# Patient Record
Sex: FEMALE | Race: OTHER | URBAN - METROPOLITAN AREA
[De-identification: names, ages, dates, MRNs, and addresses within clinical notes are randomized per-mention and may not be internally consistent; named-entity substitution may affect disease eponyms.]

---

## 2017-01-03 ENCOUNTER — EMERGENCY (EMERGENCY)
Facility: HOSPITAL | Age: 22
LOS: 1 days | Discharge: HOME | End: 2017-01-03
Attending: EMERGENCY MEDICINE
Payer: SELF-PAY

## 2017-01-03 VITALS
SYSTOLIC BLOOD PRESSURE: 126 MMHG | OXYGEN SATURATION: 100 % | TEMPERATURE: 100 F | DIASTOLIC BLOOD PRESSURE: 88 MMHG | HEART RATE: 86 BPM | RESPIRATION RATE: 20 BRPM

## 2017-01-03 VITALS — TEMPERATURE: 99 F | OXYGEN SATURATION: 100 % | HEART RATE: 110 BPM | RESPIRATION RATE: 24 BRPM

## 2017-01-03 DIAGNOSIS — M54.5 LOW BACK PAIN: ICD-10-CM
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
## 2017-01-03 DIAGNOSIS — R07.81 PLEURODYNIA: ICD-10-CM

## 2017-01-03 LAB — HCG UR QL: NEGATIVE — SIGNIFICANT CHANGE UP

## 2017-01-03 PROCEDURE — 96372 THER/PROPH/DIAG INJ SC/IM: CPT

## 2017-01-03 PROCEDURE — 71101 X-RAY EXAM UNILAT RIBS/CHEST: CPT | Mod: 26

## 2017-01-03 PROCEDURE — 81025 URINE PREGNANCY TEST: CPT

## 2017-01-03 PROCEDURE — 99284 EMERGENCY DEPT VISIT MOD MDM: CPT | Mod: 25

## 2017-01-03 PROCEDURE — 72100 X-RAY EXAM L-S SPINE 2/3 VWS: CPT | Mod: 26

## 2017-01-03 PROCEDURE — 99284 EMERGENCY DEPT VISIT MOD MDM: CPT

## 2017-01-03 PROCEDURE — 71101 X-RAY EXAM UNILAT RIBS/CHEST: CPT

## 2017-01-03 PROCEDURE — 72100 X-RAY EXAM L-S SPINE 2/3 VWS: CPT

## 2017-01-03 RX ORDER — IBUPROFEN 200 MG
1 TABLET ORAL
Qty: 28 | Refills: 0 | OUTPATIENT
Start: 2017-01-03 | End: 2017-01-10

## 2017-01-03 RX ORDER — METHOCARBAMOL 500 MG/1
1500 TABLET, FILM COATED ORAL ONCE
Qty: 0 | Refills: 0 | Status: COMPLETED | OUTPATIENT
Start: 2017-01-03 | End: 2017-01-03

## 2017-01-03 RX ORDER — KETOROLAC TROMETHAMINE 30 MG/ML
60 SYRINGE (ML) INJECTION ONCE
Qty: 0 | Refills: 0 | Status: DISCONTINUED | OUTPATIENT
Start: 2017-01-03 | End: 2017-01-03

## 2017-01-03 RX ORDER — METHOCARBAMOL 500 MG/1
2 TABLET, FILM COATED ORAL
Qty: 24 | Refills: 0 | OUTPATIENT
Start: 2017-01-03 | End: 2017-01-07

## 2017-01-03 RX ADMIN — METHOCARBAMOL 1500 MILLIGRAM(S): 500 TABLET, FILM COATED ORAL at 16:54

## 2017-01-03 RX ADMIN — Medication 60 MILLIGRAM(S): at 16:55
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
## 2017-01-03 NOTE — ED PROVIDER NOTE - NEUROLOGICAL, MLM
Alert and oriented, no focal deficits, no motor or sensory deficits. 5/5 strength in UE/LE b/l, CN/NV intact, no ataxia.

## 2017-01-03 NOTE — ED PROVIDER NOTE - PHYSICAL EXAMINATION
Musculoskeletal: no spinal deformities, no midlines cervical/spinal tenderness palpated, + left paraspinal lumbar tenderness with palpation, + left lower lateral rib tenderness with palpation, 2+ pulses in UE/LE b/l, FROM of all extremities x 4 Musculoskeletal: no spinal deformities, no midline cervical/spinal tenderness palpated, NEXUS negative, + left paraspinal lumbar tenderness with palpation, + left lower lateral rib tenderness with palpation, 2+ pulses in UE/LE b/l, FROM of all extremities x 4

## 2017-01-03 NOTE — ED PROVIDER NOTE - CONDUCTED A DETAILED DISCUSSION WITH PATIENT AND/OR GUARDIAN REGARDING, MDM
need for outpatient follow-up/return to ED if symptoms worsen, persist or questions arise/radiology results

## 2017-01-03 NOTE — ED PROVIDER NOTE - ATTENDING CONTRIBUTION TO CARE
I, Radha Martinez, performed the initial face to face bedside interview with this patient regarding history of present illness, review of symptoms and relevant past medical, social and family history.  I completed an independent physical examination.  I was the initial provider who evaluated this patient. I have signed out the follow up of any pending tests (i.e. labs, radiological studies) to the ACP.  I have communicated the patient’s plan of care and disposition with the ACP.  The history, relevant review of systems, past medical and surgical history, medical decision making, and physical examination was documented by the scribe in my presence and I attest to the accuracy of the documentation.

## 2017-01-03 NOTE — ED PROVIDER NOTE - PROGRESS NOTE DETAILS
Pt reports significant relief of presenting symptoms. Xrays are negative for acute pathology. D/w Dr. Martinez -- pt stable for d/c.

## 2017-02-21 ENCOUNTER — EMERGENCY (EMERGENCY)
Facility: HOSPITAL | Age: 22
LOS: 1 days | Discharge: HOME | End: 2017-02-21
Attending: EMERGENCY MEDICINE | Admitting: EMERGENCY MEDICINE
Payer: SELF-PAY

## 2017-02-21 VITALS
HEART RATE: 94 BPM | TEMPERATURE: 98 F | WEIGHT: 177.91 LBS | SYSTOLIC BLOOD PRESSURE: 121 MMHG | OXYGEN SATURATION: 99 % | DIASTOLIC BLOOD PRESSURE: 75 MMHG | HEIGHT: 64 IN | RESPIRATION RATE: 20 BRPM

## 2017-02-21 DIAGNOSIS — T16.1XXA FOREIGN BODY IN RIGHT EAR, INITIAL ENCOUNTER: ICD-10-CM

## 2017-02-21 PROCEDURE — 99283 EMERGENCY DEPT VISIT LOW MDM: CPT

## 2017-02-21 PROCEDURE — 99053 MED SERV 10PM-8AM 24 HR FAC: CPT

## 2017-02-21 PROCEDURE — 99283 EMERGENCY DEPT VISIT LOW MDM: CPT | Mod: 25

## 2017-02-21 RX ORDER — IBUPROFEN 200 MG
600 TABLET ORAL ONCE
Qty: 0 | Refills: 0 | Status: COMPLETED | OUTPATIENT
Start: 2017-02-21 | End: 2017-02-21

## 2017-02-21 RX ORDER — CIPROFLOXACIN AND DEXAMETHASONE 3; 1 MG/ML; MG/ML
3 SUSPENSION/ DROPS AURICULAR (OTIC)
Qty: 0 | Refills: 0 | Status: DISCONTINUED | OUTPATIENT
Start: 2017-02-21 | End: 2017-02-25

## 2017-02-21 RX ADMIN — CIPROFLOXACIN AND DEXAMETHASONE 3 DROP(S): 3; 1 SUSPENSION/ DROPS AURICULAR (OTIC) at 04:04

## 2017-02-21 RX ADMIN — Medication 600 MILLIGRAM(S): at 04:04

## 2017-02-21 NOTE — ED PROVIDER NOTE - OBJECTIVE STATEMENT
Pt is a 22yo female with no significant PMHx presenting with FB in her ear x tonight. pt reports she felt something in her ear so she put a q tip in and saw her ear was bleeding so she came to ED. pt reports she did not put anything or see anything go in her ear prior. pt reports decreased hearing when symptoms started. pt denies fever, chills, rash, sore throat, cough, CP SOB. NKDA

## 2017-02-21 NOTE — ED PROCEDURE NOTE - PROCEDURE ADDITIONAL DETAILS
viscous lidoane 2 percent to kill bug and then ear irrigated with sterile water able to remove bug no residual tm perforation abx f.u ent without fail

## 2017-02-21 NOTE — ED PROVIDER NOTE - MEDICAL DECISION MAKING DETAILS
Pt is a 22yo female with Right ear FB. Pt is a 22yo female with Right ear FB. FB removed. Pt given abx drops. Pt advised to follow up with ENT. pt verbalized understanding and agreement with plan and dx. tres jean-baptiste

## 2017-02-21 NOTE — ED PROVIDER NOTE - ATTENDING CONTRIBUTION TO CARE
I personally saw the patient with the PA, and completed the key components of the history and physical exam. I then discussed the management plan with the PA.   gen in nad resp clear cardiac no murmur heeent + beg bug right ear  removed abx gtt ent f.u no ridge loss on exam

## 2019-09-11 ENCOUNTER — TRANSCRIPTION ENCOUNTER (OUTPATIENT)
Age: 24
End: 2019-09-11

## 2020-11-19 ENCOUNTER — TRANSCRIPTION ENCOUNTER (OUTPATIENT)
Age: 25
End: 2020-11-19

## 2021-05-10 ENCOUNTER — APPOINTMENT (OUTPATIENT)
Dept: DISASTER EMERGENCY | Facility: OTHER | Age: 26
End: 2021-05-10
Payer: COMMERCIAL

## 2021-05-10 PROCEDURE — 0012A: CPT

## 2022-07-15 ENCOUNTER — EMERGENCY (EMERGENCY)
Facility: HOSPITAL | Age: 27
LOS: 0 days | Discharge: HOME | End: 2022-07-15
Attending: STUDENT IN AN ORGANIZED HEALTH CARE EDUCATION/TRAINING PROGRAM
Payer: COMMERCIAL

## 2022-07-15 ENCOUNTER — TRANSCRIPTION ENCOUNTER (OUTPATIENT)
Age: 27
End: 2022-07-15

## 2022-07-15 VITALS
RESPIRATION RATE: 18 BRPM | HEART RATE: 89 BPM | SYSTOLIC BLOOD PRESSURE: 129 MMHG | OXYGEN SATURATION: 98 % | TEMPERATURE: 98 F | DIASTOLIC BLOOD PRESSURE: 83 MMHG

## 2022-07-15 VITALS — WEIGHT: 216.93 LBS | HEIGHT: 64 IN

## 2022-07-15 DIAGNOSIS — O99.891 OTHER SPECIFIED DISEASES AND CONDITIONS COMPLICATING PREGNANCY: ICD-10-CM

## 2022-07-15 DIAGNOSIS — O23.42 UNSPECIFIED INFECTION OF URINARY TRACT IN PREGNANCY, SECOND TRIMESTER: ICD-10-CM

## 2022-07-15 DIAGNOSIS — Z88.5 ALLERGY STATUS TO NARCOTIC AGENT: ICD-10-CM

## 2022-07-15 DIAGNOSIS — Z3A.14 14 WEEKS GESTATION OF PREGNANCY: ICD-10-CM

## 2022-07-15 DIAGNOSIS — R10.31 RIGHT LOWER QUADRANT PAIN: ICD-10-CM

## 2022-07-15 DIAGNOSIS — Z20.822 CONTACT WITH AND (SUSPECTED) EXPOSURE TO COVID-19: ICD-10-CM

## 2022-07-15 LAB
ALBUMIN SERPL ELPH-MCNC: 3.4 G/DL — SIGNIFICANT CHANGE UP (ref 3.3–5)
ALP SERPL-CCNC: 66 U/L — SIGNIFICANT CHANGE UP (ref 40–120)
ALT FLD-CCNC: 36 U/L — SIGNIFICANT CHANGE UP (ref 12–78)
ANION GAP SERPL CALC-SCNC: 7 MMOL/L — SIGNIFICANT CHANGE UP (ref 5–17)
APPEARANCE UR: ABNORMAL
APTT BLD: 30.4 SEC — SIGNIFICANT CHANGE UP (ref 27.5–35.5)
AST SERPL-CCNC: 26 U/L — SIGNIFICANT CHANGE UP (ref 15–37)
BASOPHILS # BLD AUTO: 0.06 K/UL — SIGNIFICANT CHANGE UP (ref 0–0.2)
BASOPHILS NFR BLD AUTO: 0.4 % — SIGNIFICANT CHANGE UP (ref 0–2)
BILIRUB SERPL-MCNC: 0.2 MG/DL — SIGNIFICANT CHANGE UP (ref 0.2–1.2)
BILIRUB UR-MCNC: NEGATIVE — SIGNIFICANT CHANGE UP
BUN SERPL-MCNC: 7 MG/DL — SIGNIFICANT CHANGE UP (ref 7–23)
CALCIUM SERPL-MCNC: 9.9 MG/DL — SIGNIFICANT CHANGE UP (ref 8.5–10.1)
CHLORIDE SERPL-SCNC: 104 MMOL/L — SIGNIFICANT CHANGE UP (ref 96–108)
CO2 SERPL-SCNC: 23 MMOL/L — SIGNIFICANT CHANGE UP (ref 22–31)
COLOR SPEC: YELLOW — SIGNIFICANT CHANGE UP
CREAT SERPL-MCNC: 0.6 MG/DL — SIGNIFICANT CHANGE UP (ref 0.5–1.3)
DIFF PNL FLD: ABNORMAL
EGFR: 127 ML/MIN/1.73M2 — SIGNIFICANT CHANGE UP
EOSINOPHIL # BLD AUTO: 0.07 K/UL — SIGNIFICANT CHANGE UP (ref 0–0.5)
EOSINOPHIL NFR BLD AUTO: 0.5 % — SIGNIFICANT CHANGE UP (ref 0–6)
GLUCOSE SERPL-MCNC: 89 MG/DL — SIGNIFICANT CHANGE UP (ref 70–99)
GLUCOSE UR QL: NEGATIVE — SIGNIFICANT CHANGE UP
HCG SERPL-ACNC: HIGH MIU/ML
HCT VFR BLD CALC: 36.8 % — SIGNIFICANT CHANGE UP (ref 34.5–45)
HGB BLD-MCNC: 11.9 G/DL — SIGNIFICANT CHANGE UP (ref 11.5–15.5)
IMM GRANULOCYTES NFR BLD AUTO: 0.2 % — SIGNIFICANT CHANGE UP (ref 0–1.5)
INR BLD: 1.18 RATIO — HIGH (ref 0.88–1.16)
KETONES UR-MCNC: ABNORMAL
LEUKOCYTE ESTERASE UR-ACNC: ABNORMAL
LIDOCAIN IGE QN: 69 U/L — LOW (ref 73–393)
LYMPHOCYTES # BLD AUTO: 17.3 % — SIGNIFICANT CHANGE UP (ref 13–44)
LYMPHOCYTES # BLD AUTO: 2.33 K/UL — SIGNIFICANT CHANGE UP (ref 1–3.3)
MCHC RBC-ENTMCNC: 26 PG — LOW (ref 27–34)
MCHC RBC-ENTMCNC: 32.3 GM/DL — SIGNIFICANT CHANGE UP (ref 32–36)
MCV RBC AUTO: 80.3 FL — SIGNIFICANT CHANGE UP (ref 80–100)
MONOCYTES # BLD AUTO: 0.63 K/UL — SIGNIFICANT CHANGE UP (ref 0–0.9)
MONOCYTES NFR BLD AUTO: 4.7 % — SIGNIFICANT CHANGE UP (ref 2–14)
NEUTROPHILS # BLD AUTO: 10.38 K/UL — HIGH (ref 1.8–7.4)
NEUTROPHILS NFR BLD AUTO: 76.9 % — SIGNIFICANT CHANGE UP (ref 43–77)
NITRITE UR-MCNC: NEGATIVE — SIGNIFICANT CHANGE UP
PH UR: 6 — SIGNIFICANT CHANGE UP (ref 5–8)
PLATELET # BLD AUTO: 404 K/UL — HIGH (ref 150–400)
POTASSIUM SERPL-MCNC: 4.2 MMOL/L — SIGNIFICANT CHANGE UP (ref 3.5–5.3)
POTASSIUM SERPL-SCNC: 4.2 MMOL/L — SIGNIFICANT CHANGE UP (ref 3.5–5.3)
PROT SERPL-MCNC: 7.9 GM/DL — SIGNIFICANT CHANGE UP (ref 6–8.3)
PROT UR-MCNC: 15
PROTHROM AB SERPL-ACNC: 13.7 SEC — HIGH (ref 10.5–13.4)
RBC # BLD: 4.58 M/UL — SIGNIFICANT CHANGE UP (ref 3.8–5.2)
RBC # FLD: 14.2 % — SIGNIFICANT CHANGE UP (ref 10.3–14.5)
SARS-COV-2 RNA SPEC QL NAA+PROBE: SIGNIFICANT CHANGE UP
SODIUM SERPL-SCNC: 134 MMOL/L — LOW (ref 135–145)
SP GR SPEC: 1.01 — SIGNIFICANT CHANGE UP (ref 1.01–1.02)
UROBILINOGEN FLD QL: NEGATIVE — SIGNIFICANT CHANGE UP
WBC # BLD: 13.5 K/UL — HIGH (ref 3.8–10.5)
WBC # FLD AUTO: 13.5 K/UL — HIGH (ref 3.8–10.5)

## 2022-07-15 PROCEDURE — 99285 EMERGENCY DEPT VISIT HI MDM: CPT

## 2022-07-15 PROCEDURE — 85610 PROTHROMBIN TIME: CPT

## 2022-07-15 PROCEDURE — 72195 MRI PELVIS W/O DYE: CPT | Mod: 26,MA

## 2022-07-15 PROCEDURE — 99203 OFFICE O/P NEW LOW 30 MIN: CPT | Mod: 1L,GC

## 2022-07-15 PROCEDURE — 76815 OB US LIMITED FETUS(S): CPT

## 2022-07-15 PROCEDURE — 99243 OFF/OP CNSLTJ NEW/EST LOW 30: CPT | Mod: 1L,GC

## 2022-07-15 PROCEDURE — 85025 COMPLETE CBC W/AUTO DIFF WBC: CPT

## 2022-07-15 PROCEDURE — 72195 MRI PELVIS W/O DYE: CPT | Mod: MA

## 2022-07-15 PROCEDURE — 80053 COMPREHEN METABOLIC PANEL: CPT

## 2022-07-15 PROCEDURE — G1004: CPT

## 2022-07-15 PROCEDURE — 76705 ECHO EXAM OF ABDOMEN: CPT

## 2022-07-15 PROCEDURE — 86901 BLOOD TYPING SEROLOGIC RH(D): CPT

## 2022-07-15 PROCEDURE — U0005: CPT

## 2022-07-15 PROCEDURE — 86900 BLOOD TYPING SEROLOGIC ABO: CPT

## 2022-07-15 PROCEDURE — 99284 EMERGENCY DEPT VISIT MOD MDM: CPT | Mod: 25

## 2022-07-15 PROCEDURE — 96374 THER/PROPH/DIAG INJ IV PUSH: CPT

## 2022-07-15 PROCEDURE — U0003: CPT

## 2022-07-15 PROCEDURE — 74181 MRI ABDOMEN W/O CONTRAST: CPT | Mod: 26,MG

## 2022-07-15 PROCEDURE — 76815 OB US LIMITED FETUS(S): CPT | Mod: 26

## 2022-07-15 PROCEDURE — 84702 CHORIONIC GONADOTROPIN TEST: CPT

## 2022-07-15 PROCEDURE — 87186 SC STD MICRODIL/AGAR DIL: CPT

## 2022-07-15 PROCEDURE — 85730 THROMBOPLASTIN TIME PARTIAL: CPT

## 2022-07-15 PROCEDURE — 83690 ASSAY OF LIPASE: CPT

## 2022-07-15 PROCEDURE — 76705 ECHO EXAM OF ABDOMEN: CPT | Mod: 26

## 2022-07-15 PROCEDURE — 36415 COLL VENOUS BLD VENIPUNCTURE: CPT

## 2022-07-15 PROCEDURE — 87086 URINE CULTURE/COLONY COUNT: CPT

## 2022-07-15 PROCEDURE — 86850 RBC ANTIBODY SCREEN: CPT

## 2022-07-15 PROCEDURE — 81001 URINALYSIS AUTO W/SCOPE: CPT

## 2022-07-15 PROCEDURE — 74181 MRI ABDOMEN W/O CONTRAST: CPT | Mod: MG

## 2022-07-15 RX ORDER — ACETAMINOPHEN 500 MG
1000 TABLET ORAL ONCE
Refills: 0 | Status: COMPLETED | OUTPATIENT
Start: 2022-07-15 | End: 2022-07-15

## 2022-07-15 RX ORDER — CEPHALEXIN 500 MG
1 CAPSULE ORAL
Qty: 14 | Refills: 0
Start: 2022-07-15 | End: 2022-07-21

## 2022-07-15 RX ORDER — CEPHALEXIN 500 MG
500 CAPSULE ORAL ONCE
Refills: 0 | Status: COMPLETED | OUTPATIENT
Start: 2022-07-15 | End: 2022-07-15

## 2022-07-15 RX ADMIN — Medication 500 MILLIGRAM(S): at 19:49

## 2022-07-15 RX ADMIN — Medication 400 MILLIGRAM(S): at 19:02

## 2022-07-15 NOTE — ED STATDOCS - CARE PROVIDER_API CALL
Yesenia Morocho  GASTROENTEROLOGY  [address]  [place] [address]  Phone: [phone]  Fax: [phone]  Follow Up Time:

## 2022-07-15 NOTE — ED PROVIDER NOTE - CCCP TRG CHIEF CMPLNT
What Type Of Note Output Would You Prefer (Optional)?: Standard Output How Severe Is Your Rash?: moderate Is This A New Presentation, Or A Follow-Up?: Rash abdominal pain

## 2022-07-15 NOTE — CONSULT NOTE ADULT - ASSESSMENT
MICHAEL LONG is a 26yo  at 14w1d based off LMP 2022 POD#9 s/p laparoscopic left ovarian cystectomy at [place], who presents with RLQ pain worsening over the past 2 days. Physical exam findings concerning for appendicitis.   Labs and vitals overall wnl- slightly elevated wbc- which can be expected pregnancy   PT S/p abdominal US- normal appearing IUP approximately 14 weeks, appendix not visualized. Recommend MRI to assess for further: r/o appendicitis vs right adnexal pathology.   Consider general surgery consult  IV tylenol for pain   Will reassess after MRI

## 2022-07-15 NOTE — ED ADULT NURSE NOTE - OBJECTIVE STATEMENT
Pt reports progressively worsening RLQ pain. Pt reports that evaluation at [facility] with labs and ultrasound was inconclusive, but that due to pregnancy they did not want to do a ct scan. Pt reports that she was told to return for worsening symptoms. Denies any vaginal bleeding.

## 2022-07-15 NOTE — ED PROVIDER NOTE - NS ED SCRIBE STATEMENT
patient has hx of aspirin allergy, hives. Patient is prescribed Ketorolac ophth drops pre-operatively.
Attending

## 2022-07-15 NOTE — ED STATDOCS - PROGRESS NOTE DETAILS
signed Teri Hernandez PA-C Pt seen initially in intake by Dr Silva.   27F G1 14 weeks pregnant here for eval of right sided abdominal pain. Pt was seen in [place] [facility] and had LEFT ovarian cyst removal for torsion on 7/7 and then developed right sided pain on 7/9. Pt went back to [facility] and had sonos and saw OB but was DCed without getting an MRI. Pt states she was told this was due to concerns about radiation. pt still has pain and came to ER today for eval. WBC 13, nonspecific. +uti on UA, will treat with keflex. OB saw pt in ED and reviewed imaging. Do not feel pts complaints are OB-related today.  MRI no appendicitis but has some liver nodules and possible cyst. LFTs WNL. Pt was seen in ED by sx Dr Zendejas, may DC, recommend pt f/u with GI as outpt for sono and additional testing. return precautions given. Pt feeling well, pt and family agree with DC and plan of care. OB is at [facility]

## 2022-07-15 NOTE — ED PROVIDER NOTE - OBJECTIVE STATEMENT
Pt called stated she has been having abdominal pain in her lower abdomen and the back that started saturday.Pt thinks she is constipated, doesn't know if the pain is related to a procedure she had done on June 18 where she swallowed  a big horse pill. Pt doesn't remember the name of the procedure. Pt said her last normal bowel movement was Thursday, but had a hard labored stool today. Pt stated the pain is mainly on her back, rated 7/10, no pain medication taken yet.Pt took a single dose of miralax on saturday morning, and dulcolax suppository today. No relive noted, pt continues to have  pain. On call provider was called and spoke with DR Coreas, and she suggested pt to go to the ER for the pain she is in. Provider suggested pt to take miralax twice a day. Pt was told to go to he ER, and stated she will go to [facility] ER tonight or in the morning. Writer encouraged going to the ER tonight to be evaluated. Care advice given regarding constipation.  Florentin Roblero RN  COVID 19 Nurse Triage Plan/Patient Instructions    Please be aware that novel coronavirus (COVID-19) may be circulating in the community. If you develop symptoms such as fever, cough, or SOB or if you have concerns about the presence of another infection including coronavirus (COVID-19), please contact your health care provider or visit [email or web address].     Disposition/Instructions    Patient to go to ED and follow protocol based instructions.     Bring Your Own Device:  Please also bring your smart device(s) (smart phones, tablets, laptops) and their charging cables for your personal use and to communicate with your care team during your visit.      Thank you for taking steps to prevent the spread of this virus.  o Limit your contact with others.  o Wear a simple mask to cover your cough.  o Wash your hands well and often.    Resources    [facility]: About COVID-19: [email or web address]    CDC: What to Do If You're Sick:  www.cdc.gov/coronavirus/2019-ncov/about/steps-when-sick.html    CDC: Ending Home Isolation: www.cdc.gov/coronavirus/2019-ncov/hcp/disposition-in-home-patients.html     CDC: Caring for Someone: www.cdc.gov/coronavirus/2019-ncov/if-you-are-sick/care-for-someone.html     [facility]: Interim Guidance for Hospital Discharge to Home: www.health.[facility].mn./diseases/coronavirus/hcp/hospdischarge.pdf    [facility] clinical trials (COVID-19 research studies): [email or web address]-clinical-trials     Below are the COVID-19 hotlines at the [place] Department of Health ([facility]). Interpreters are available.   o For health questions: Call 850-181-5182 or 1-468.262.7075 (7 a.m. to 7 p.m.)  o For questions about schools and childcare: Call 044-689-6214 or 1-109.261.4753 (7 a.m. to 7 p.m.)       Additional Information    Negative: Shock suspected (very weak, limp, not moving, pale cool skin, etc)    Negative: Sounds like a life-threatening emergency to the triager    Negative: Age < 3 months    Negative: Age 3-12 months    Negative: Vomiting and diarrhea present    Negative: Vomiting is the main symptom    Negative: [1] Diarrhea is the main symptom AND [2] abdominal pain is mild and intermittent    Constipation is the main symptom or being treated for constipation (Exception: SEVERE pain)    Negative: [1] Stomach ache is the main concern AND [2] not being treated for constipation AND [3] female    Negative: [1] Stomach ache is the main concern AND [2] not being treated for constipation AND [3] male    Negative: [1] Vomiting also present AND [2] child < 12 weeks of age    Negative: [1] Doesn't meet definition of constipation AND [2] crying baby < 3 months of age    Negative: [1] Doesn't meet definition of constipation AND [2] crying child > 3 months of age    Negative: [1] Age < 2 weeks old AND [2] breastfeeding    Negative: [1] Age < 1 month AND [2] breastfeeding AND [3] baby is not feeding well OR nursing is not well  established    Negative: Poor formula intake is main concern    Negative: Normal stool pattern questions ( baby)    Negative: Normal stool pattern questions (formula fed baby)    Negative: [1] Vomiting AND [2] > 3 times in last 2 hours  (Exception: vomiting from acute viral illness)    Negative: [1] Age < 1 month AND [2]  AND [3] signs of dehydration (no urine > 8 hours, sunken soft spot, very dry mouth)    Negative: [1] Age < 12 months AND [2] weak cry, weak suck or weak muscles AND [3] onset in last month    Negative: Appendicitis suspected (e.g., constant pain > 2 hours, RLQ location, walks bent over holding abdomen, jumping makes pain worse, etc)    Negative: [1] Intussusception suspected (brief attacks of severe crying suddenly switching to 2-10 minute periods of quiet) AND [2] age < 3 years    Negative: Child sounds very sick or weak to the triager    [1] Acute ABDOMINAL pain with constipation AND [2] not relieved by suppository and warm bath    Protocols used: ABDOMINAL PAIN - FEMALE-P-AH, CONSTIPATION-P-AH       26 y/o female with no pertinent PMHx presents to the ED c/o abd pain since 07/09. Notes she is pregnant, 1st pregnancy. States she has been having abd pain, went to [place] and was told they were unable to give her an MRI but got US where they suscted [facility] etiology.  Reports she has been having increased pain, [facility] and [facility]. No n/v, vaginal bleed.  Notes ovarian torsion with cyst removal surgery 07/07 at [place]. Allergic to NSAIDS. OB: Anton Holman.

## 2022-07-15 NOTE — ED STATDOCS - OBJECTIVE STATEMENT
26 y/o female with no pertinent PMHx presents to the ED c/o abd pain since 07/09. Notes she is pregnant, 1st pregnancy. States she has been having abd pain, went to [place] and was told they were unable to give her an MRI but got US where they suscted appedix etiology.  Reports she has been having increased pain, called [facility] and was sent to ED. No n/v, vaginal bleed.  Notes ovarian torsion with cyst removal surgery 07/07 at [place]. Allergic to NSAIDS. OB: Anton Holman. 26 y/o female with no pertinent PMHx presents to the ED c/o abd pain since 07/09. 14 weeks pregnant; one week ago had surgery for left ovarian cyst; continued rlq pain x 1 week; seen at outside hospital; ultrasounds performed; d/theresa home; continued pain; nausea; no vomiting; no fever or chills; no vaginal bleeding; presents to ED for further evaluation; Allergic to NSAIDS. OB: Anton Holman.

## 2022-07-15 NOTE — ED STATDOCS - NS ED ATTENDING STATEMENT MOD
This was a shared visit with the ÁNGELA. I reviewed and verified the documentation and independently performed the documented:

## 2022-07-15 NOTE — ED ADULT NURSE NOTE - CHIEF COMPLAINT QUOTE
PT C/O RLQ ABD PAIN X 1 WEEK AGO, WAS SEEN AT [facility], [facility] 2 DAYS AGO. DENIES FEVER, CHILLS.

## 2022-07-15 NOTE — CONSULT NOTE ADULT - ATTENDING COMMENTS
26yo  at 14w1d now POD#9 from a laparoscopic L ovarian cystectomy and detorsion at [place] and presents with RLQ pain x 2 days.  US with live IUP and non-visualization of appendix. WBC 13, CMP unremarkable, Lipase 69. UA with trace blood. MRI pending.  PETER Burciaga MD 28yo  at 14w1d presents with RLQ pain x 2 days.  Pt is POD#9 from laparoscopic L ovarian cystectomy and detorsion at [place]. Evaluated there for her RLQ and discharged after a normal US.  Pt without GI history, no h/o myomas. Only PMH is asthma.  US abd/pelvis here illustrates live IUP with nonvisualization of adnexa and appendix. No evidence of abscess.  WB 13, no clinical evidence of active infection. UA with trace blood.  CMP unremarkable with Lipase 69  MRI abd/pelvis pending.  PETER Burciaga MD

## 2022-07-15 NOTE — ED STATDOCS - NSFOLLOWUPINSTRUCTIONS_ED_ALL_ED_FT
FOLLOW UP WITH A GASTROENTEROLOGIST THIS WEEK FOR MORE LIVER TESTS. SEE YOUR OB DOCTOR TO CHECK YOUR URINE AFTER YOU FINISH YOUR ANTIBIOTICS. CALL THE OFFICE TO MAKE AN APPOINTMENT. RETURN TO ER FOR ANY WORSENING SYMPTOMS OR NEW CONCERNS.     Abdominal Pain During Pregnancy      Abdominal pain is common during pregnancy and has many possible causes. Some causes are more serious than others, and sometimes the cause is not known.    Abdominal pain can be a sign that labor is starting. It can also be caused by normal growth of your baby causing stretching of muscles and ligaments during pregnancy. Always tell your health care provider if you have any abdominal pain.      Follow these instructions at home:     • Do not have sex or put anything in your vagina until your pain goes away completely.      •Get plenty of rest until your pain improves.      •Drink enough fluid to keep your urine pale yellow.      •Take over-the-counter and prescription medicines only as told by your health care provider.      •Keep all follow-up visits. This is important.        Contact a health care provider if:    •Your pain continues or gets worse after resting.    •You have lower abdominal pain that:  •Comes and goes at regular intervals.      •Spreads to your back.      •Is similar to menstrual cramps.        •You have pain or burning when you urinate.        Get help right away if:    •You have a fever, chills, or shortness of breath.      •You have vaginal bleeding.      •You are leaking fluid or passing tissue from your vagina.      •You have vomiting or diarrhea that lasts for more than 24 hours.      •Your baby is moving less than usual.      •You feel very weak or faint.      •You develop severe pain in your upper abdomen.        Summary    •Abdominal pain is common during pregnancy and has many possible causes.      •If you experience abdominal pain during pregnancy, tell your health care provider right away.      •Follow your health care provider's home care instructions and keep all follow-up visits as told.      This information is not intended to replace advice given to you by your health care provider. Make sure you discuss any questions you have with your health care provider.      Document Revised: 08/31/2021 Document Reviewed: 08/31/2021    [employer] Patient Education © 2022 [employer]     Urinary Tract Infection, Adult  ImageA urinary tract infection (UTI) is an infection of any part of the urinary tract, which includes the kidneys, ureters, bladder, and urethra. These organs make, store, and get rid of urine in the body. UTI can be a bladder infection (cystitis) or kidney infection (pyelonephritis).    What are the causes?  This infection may be caused by fungi, viruses, or bacteria. Bacteria are the most common cause of UTIs. This condition can also be caused by repeated incomplete emptying of the bladder during urination.    What increases the risk?  This condition is more likely to develop if:    You ignore your need to urinate or hold urine for long periods of time.  You do not empty your bladder completely during urination.  You wipe back to front after urinating or having a bowel movement, if you are female.  You are uncircumcised, if you are male.  You are constipated.  You have a urinary catheter that stays in place (indwelling).  You have a weak defense (immune) system.  You have a medical condition that affects your bowels, kidneys, or bladder.  You have diabetes.  You take antibiotic medicines frequently or for long periods of time, and the antibiotics no longer work well against certain types of infections (antibiotic resistance).  You take medicines that irritate your urinary tract.  You are exposed to chemicals that irritate your urinary tract.  You are female.    What are the signs or symptoms?  Symptoms of this condition include:    Fever.  Frequent urination or passing small amounts of urine frequently.  Needing to urinate urgently.  Pain or burning with urination.  Urine that smells bad or unusual.  Cloudy urine.  Pain in the lower abdomen or back.  Trouble urinating.  Blood in the urine.  Vomiting or being less hungry than normal.  Diarrhea or abdominal pain.  Vaginal discharge, if you are female.    How is this diagnosed?  This condition is diagnosed with a medical history and physical exam. You will also need to provide a urine sample to test your urine. Other tests may be done, including:    Blood tests.  Sexually transmitted disease (STD) testing.    If you have had more than one UTI, a cystoscopy or imaging studies may be done to determine the cause of the infections.    How is this treated?  Treatment for this condition often includes a combination of two or more of the following:    Antibiotic medicine.  Other medicines to treat less common causes of UTI.  Over-the-counter medicines to treat pain.  Drinking enough water to stay hydrated.    Follow these instructions at home:  Take over-the-counter and prescription medicines only as told by your health care provider.  If you were prescribed an antibiotic, take it as told by your health care provider. Do not stop taking the antibiotic even if you start to feel better.  Avoid alcohol, caffeine, tea, and carbonated beverages. They can irritate your bladder.  Drink enough fluid to keep your urine clear or pale yellow.  Keep all follow-up visits as told by your health care provider. This is important.  ImageMake sure to:    Empty your bladder often and completely. Do not hold urine for long periods of time.  Empty your bladder before and after sex.  Wipe from front to back after a bowel movement if you are female. Use each tissue one time when you wipe.    Contact a health care provider if:  You have back pain.  You have a fever.  You feel nauseous or vomit.  Your symptoms do not get better after 3 days.  Your symptoms go away and then return.  Get help right away if:  You have severe back pain or lower abdominal pain.  You are vomiting and cannot keep down any medicines or water.  This information is not intended to replace advice given to you by your health care provider. Make sure you discuss any questions you have with your health care provider.

## 2022-07-15 NOTE — CONSULT NOTE ADULT - SUBJECTIVE AND OBJECTIVE BOX
MICHAEL LONG is a 26yo  at 14w1d based off LMP 2022 POD#9 s/p laparoscopic left ovarian cystectomy at [place], who presents with RLQ pain worsening over the past 2 days. Pain now 8/10 with associated loss of appetite and nausea. She denies any fevers, chills, vomiting/diarrhea, unusual discharge or vaginal bleeding. Of note, patient was seen at [facility] 2 days ago- and was discharged home after pelvic US with no acute findings.       PAST MEDICAL & SURGICAL HISTORY:  Laparoscopic left ovarian cystectomy  Asthma     ObHx: none  GynHx:     no h/o abnormal Paps  denies h/o fibroids, + h/o cysts, no STIs      SocHx:  Allergies: NSAIDs (abdominal bloating)    Meds:  acetaminophen   IVPB .. 1000 milliGRAM(s) IV Intermittent once        Height (cm): 162.6 (07-15-22 @ 13:03)  Weight (kg): 98.4 (07-15-22 @ 13:03)  BMI (kg/m2): 37.2 (07-15-22 @ 13:03)  BSA (m2): 2.03 (07-15-22 @ 13:03)  T(C): 36.7 (07-15-22 @ 14:45), Max: 37.2 (07-15-22 @ 14:37)  HR: 81 (07-15-22 @ 14:37) (81 - 81)  BP: 127/89 (07-15-22 @ 14:45) (104/77 - 127/89)  RR: 18 (07-15-22 @ 14:45) (18 - 18)  SpO2: 94% (07-15-22 @ 14:45) (94% - 98%)    Physical Exam:  Gen: alert oriented, uncomfortable appearing  HEENT: atraumatic, normocephalic  Pulm: normal respiratory effort  Abd: soft, non distended, Tender to palpation RLQ, + obterator, umbilical incision c/d/i  : no CVA tenderness  Gyn: deferred  Msk: no erythema or swelling      07-15    134<L>  |  104  |  7   ----------------------------<  89  4.2   |  23  |  0.60    Ca    9.9      15 Jul 2022 15:14    TPro  7.9  /  Alb  3.4  /  TBili  0.2  /  DBili  x   /  AST  26  /  ALT  36  /  AlkPhos  66  07-15    LIVER FUNCTIONS - ( 15 Jul 2022 15:14 )  Alb: 3.4 g/dL / Pro: 7.9 gm/dL / ALK PHOS: 66 U/L / ALT: 36 U/L / AST: 26 U/L / GGT: x           Urinalysis Basic - ( 15 Jul 2022 15:14 )    Color: Yellow / Appearance: Slightly Turbid / S.015 / pH: x  Gluc: x / Ketone: Moderate  / Bili: Negative / Urobili: Negative   Blood: x / Protein: 15 / Nitrite: Negative   Leuk Esterase: Trace / RBC: 0-2 /HPF / WBC 11-25   Sq Epi: x / Non Sq Epi: Occasional / Bacteria: Few      HCG Quantitative, Serum: 13263 mIU/mL (07-15-22 @ 15:14)

## 2022-07-15 NOTE — ED STATDOCS - CLINICAL SUMMARY MEDICAL DECISION MAKING FREE TEXT BOX
28 y/o female with no pertinent PMHx presents to the ED c/o RLQ pain , 14 weeks pregnant, US, labs and OB consult with re-eval.

## 2022-07-15 NOTE — ED PROVIDER NOTE - TEMPLATE, MLM
Abdomen soft, non-tender and non-distended, no rebound, no guarding and no masses. no hepatosplenomegaly. General

## 2022-07-15 NOTE — ED ADULT TRIAGE NOTE - CHIEF COMPLAINT QUOTE
PT C/O RLQ ABD PAIN X 1 WEEK AGO, WAS SEEN AT [facility], [facility] 2 DAYS AGO. DENIES FEVER, CHILLS.

## 2022-07-15 NOTE — ED ADULT NURSE NOTE - SUICIDE SCREENING QUESTION 1
Patient is calling stating that she has a toothache. Pt stated that she has to get a root canal done, until then pt is requesting a antibiotic for the pain. [facility] on file verified      Best callback:[phone]  LOV: Tuesday, October 22, 2019 No

## 2022-07-15 NOTE — ED PROVIDER NOTE - CLINICAL SUMMARY MEDICAL DECISION MAKING FREE TEXT BOX
26 y/o female with no pertinent PMHx presents to the ED c/o RLQ pain , 14 weeks pregnant, US, labs and OB consult with re-eval.

## 2022-07-15 NOTE — ED STATDOCS - PATIENT PORTAL LINK FT
You can access the FollowMyHealth Patient Portal offered by [facility] by registering at the following website: [email or web address]. By joining [employer]’s FollowMyHealth portal, you will also be able to view your health information using other applications (apps) compatible with our system.

## 2022-07-15 NOTE — ED STATDOCS - CARE PLAN
Principal Discharge DX:	Abdominal pain  Secondary Diagnosis:	Pregnancy  Secondary Diagnosis:	Acute UTI   1

## 2022-07-29 PROBLEM — Z00.00 ENCOUNTER FOR PREVENTIVE HEALTH EXAMINATION: Status: ACTIVE | Noted: 2022-07-29

## 2025-02-02 ENCOUNTER — NON-APPOINTMENT (OUTPATIENT)
Age: 30
End: 2025-02-02

## 2025-05-03 NOTE — ED STATDOCS - ATTENDING APP SHARED VISIT CONTRIBUTION OF CARE
8 (severe pain)
I, Kimberley Silva DO,  performed the initial face to face bedside interview with this patient regarding history of present illness, review of symptoms and relevant past medical, social and family history.  I completed an independent physical examination.  I was the initial provider who evaluated this patient.   I personally saw the patient and performed a substantive portion of the visit including all aspects of the medical decision making.  I have signed out the follow up of any pending tests (i.e. labs, radiological studies) to the ACP.  I have communicated the patient’s plan of care and disposition with the ACP.  The history, relevant review of systems, past medical and surgical history, medical decision making, and physical examination was documented by the scribe in my presence and I attest to the accuracy of the documentation.